# Patient Record
Sex: MALE | HISPANIC OR LATINO | Employment: FULL TIME | URBAN - METROPOLITAN AREA
[De-identification: names, ages, dates, MRNs, and addresses within clinical notes are randomized per-mention and may not be internally consistent; named-entity substitution may affect disease eponyms.]

---
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
## 2022-09-30 ENCOUNTER — HOSPITAL ENCOUNTER (EMERGENCY)
Facility: CLINIC | Age: 22
Discharge: LEFT BEFORE DISCHARGE | End: 2022-10-01
Payer: COMMERCIAL

## 2022-09-30 VITALS
RESPIRATION RATE: 20 BRPM | HEART RATE: 78 BPM | DIASTOLIC BLOOD PRESSURE: 60 MMHG | TEMPERATURE: 98.2 F | SYSTOLIC BLOOD PRESSURE: 140 MMHG | OXYGEN SATURATION: 100 %

## 2022-10-01 ENCOUNTER — HOSPITAL ENCOUNTER (EMERGENCY)
Facility: CLINIC | Age: 22
Discharge: HOME | End: 2022-10-01
Attending: PHYSICIAN ASSISTANT | Admitting: PHYSICIAN ASSISTANT
Payer: COMMERCIAL

## 2022-10-01 ENCOUNTER — OFFICE VISIT (OUTPATIENT)
Dept: URGENT CARE | Facility: URGENT CARE | Age: 22
End: 2022-10-01
Payer: COMMERCIAL

## 2022-10-01 VITALS
TEMPERATURE: 98.4 F | HEART RATE: 112 BPM | OXYGEN SATURATION: 95 % | DIASTOLIC BLOOD PRESSURE: 77 MMHG | BODY MASS INDEX: 24.21 KG/M2 | WEIGHT: 150 LBS | RESPIRATION RATE: 20 BRPM | SYSTOLIC BLOOD PRESSURE: 132 MMHG

## 2022-10-01 VITALS
DIASTOLIC BLOOD PRESSURE: 68 MMHG | SYSTOLIC BLOOD PRESSURE: 147 MMHG | RESPIRATION RATE: 18 BRPM | OXYGEN SATURATION: 98 % | TEMPERATURE: 98.1 F | HEART RATE: 102 BPM
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
## 2022-10-01 DIAGNOSIS — T14.8XXA PUNCTURE WOUND: ICD-10-CM
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
## 2022-10-01 PROCEDURE — 99203 OFFICE O/P NEW LOW 30 MIN: CPT | Performed by: NURSE PRACTITIONER

## 2022-10-01 PROCEDURE — 99283 EMERGENCY DEPT VISIT LOW MDM: CPT

## 2022-10-01 ASSESSMENT — ENCOUNTER SYMPTOMS
WOUND: 1
WEAKNESS: 0
COLOR CHANGE: 0
NUMBNESS: 0

## 2022-10-01 ASSESSMENT — ACTIVITIES OF DAILY LIVING (ADL): ADLS_ACUITY_SCORE: 33

## 2022-10-01 NOTE — ED PROVIDER NOTES
History     Chief Complaint:  Dog Bite       HPI   Noel Martin is a 22 year old male who presents with a dog bite to his posterior right thigh that he obtained from his dog yesterday.  He reports the dog was playing with a rope and accidentally grabbed his skin in the back of his thigh.  He presented to a local ER but the wait was too long and he left without being seen.  No fevers.  He is accompanied by his significant other.  He reports the dog is up-to-date on his rabies as far as he knows of.  His last tetanus was in 2020.  He denies any distal weakness or numbness.    ROS:  Review of Systems   Skin: Positive for wound. Negative for color change.   Neurological: Negative for weakness and numbness.   All other systems reviewed and are negative.    Allergies:  No Known Allergies     Medications:    None    Past Medical History:    No past medical history on file.  Patient Active Problem List   Diagnosis     Abdominal pain, generalized since early 9-18     Other acute gastritis without hemorrhage-peptic acid      Microscopic hematuria        Past Surgical History:    No pertinent Surgical history    Family History:    family history includes Unknown/Adopted in his father and mother.    Social History:  Presents to the ED with his significant other.    Physical Exam     Patient Vitals for the past 24 hrs:   BP Temp Pulse Resp SpO2   10/01/22 1517 (!) 147/68 98.1  F (36.7  C) 102 18 98 %        Physical Exam  Vitals and nursing note reviewed.   Eyes:      General: No scleral icterus.     Extraocular Movements: Extraocular movements intact.      Pupils: Pupils are equal, round, and reactive to light.   Musculoskeletal:      Right upper leg: Laceration and tenderness present. No swelling, edema or bony tenderness.      Right knee: Normal. No swelling, erythema, lacerations or bony tenderness. Normal range of motion. No tenderness.      Right lower leg: Normal. No swelling, lacerations, tenderness or bony  tenderness. No edema.   Skin:     General: Skin is warm.      Capillary Refill: Capillary refill takes less than 2 seconds.      Findings: Wound (puncture wounds to the posterior right thigh) present.          Neurological:      Sensory: Sensation is intact.      Motor: Motor function is intact. No weakness.   Psychiatric:         Attention and Perception: Attention and perception normal.         Mood and Affect: Mood and affect normal.       Emergency Department Course     Imaging:  No orders to display      Laboratory:  Labs Ordered and Resulted from Time of ED Arrival to Time of ED Departure - No data to display     Procedures     Emergency Department Course:         Reviewed:  I reviewed nursing notes, vitals, past medical history and MIIC    Assessments/Consults:          Interventions:  Medications - No data to display     Disposition:  The patient was discharged to home.     Impression & Plan    CMS Diagnoses: None    Medical Decision Making:     I considered multiple diagnoses including but not limited to: Laceration, fracture, ligament injury, nerve injury, vascular injury.  After evaluation based on his history of present illness and exam findings I did not feel that he exhibited in any of the above diagnoses except for laceration of the skin and subcutaneous tissues. Wounds were not close to to the nature of these wounds as puncture wounds and being from a dog bite. They were thoroughly irrigated and he will be placed on prophylactic antibiotics. He should monitor for signs of infection which include erythema, swelling, pain, purulent discharge, lymphatic streaking.  If he notes any of these signs or symptoms he should return or seek an evaluation.   He can utilize over-the-counter anti-inflammatories for pain control.   He may shower and gently rinse the area.  Tetanus was up to date.    My impression of today's diagnosis is:     ICD-10-CM    1. Dog bite, initial encounter  W54.0XXA    2. Puncture wound   T14.8XXA        Discharge Medications:  Discharge Medication List as of 10/1/2022  4:19 PM      START taking these medications    Details   amoxicillin-clavulanate (AUGMENTIN) 875-125 MG tablet Take 1 tablet by mouth 2 times daily for 7 days, Disp-14 tablet, R-0, Local Print              10/1/2022   Scott Boyer PA-C Kruger, Jacob C, PA-C  10/01/22 8489

## 2022-10-01 NOTE — PROGRESS NOTES
Assessment & Plan     Dog bite, initial encounter  Sent to ER to evaluate tendon involvement and possible irrigation of dog bite      Return today (on 10/1/2022), or to ER, for Follow up.    MODESTO Whitley CNP  [facility] MECHE Cohen is a 22 year old male who presents to clinic today for the following health issues:  Chief Complaint   Patient presents with     Urgent Care     His dog accidentally bite his right leg yesterday      HPI    Dog Bite/Sting    Onset of symptoms was 10 hour(s) ago.  Course of illness is worsening.    Severity severe  Location: posterior right knee  Context: his dog, unsure of vaccinations  Current and Associated symptoms: pain and drainage  Treatment measures tried include: nothing  Relief from treatment: none  Significant past medical history: N/A  Worried about tendon damage due to pain and immobility    Went to ER last night but wait was too long and left before being seen.    Still bleeding.      Review of Systems  Constitutional, HEENT, cardiovascular, pulmonary, GI, , musculoskeletal, neuro, skin, endocrine and psych systems are negative, except as otherwise noted.      Objective    /77   Pulse 112   Temp 98.4  F (36.9  C)   Resp 20   Wt 68 kg (150 lb)   SpO2 95%   BMI 24.21 kg/m    Physical Exam   GENERAL: healthy, alert and moderate distress  MS: no gross musculoskeletal defects noted, no edema, decreased ROM to right knee due to pain  Skin: 3 puncture wounds with mild bleeding and excoriation to posterior right knee.

## 2022-10-01 NOTE — ED TRIAGE NOTES
Patient has a dog bite to his right knee area, patient states he happened yesterday and he attempted to be seen at a ED but the wait was to long. Patient states he went to urgent care today and was recommened to come to the ED. Patient is unsure if the dog has had his rabies shot.

## 2022-10-01 NOTE — LETTER
October 3, 2022      To Whom It May Concern:      Noel Martin was seen in our Emergency Department today, 10/01/22.  I expect his condition to improve over the next 2-3 days.  He may return to work/school when improved.    Sincerely,        Tiffanie RAOMS RN

## 2025-01-26 ENCOUNTER — HOSPITAL ENCOUNTER (EMERGENCY)
Facility: CLINIC | Age: 25
Discharge: HOME | End: 2025-01-26
Attending: EMERGENCY MEDICINE | Admitting: EMERGENCY MEDICINE

## 2025-01-26 ENCOUNTER — APPOINTMENT (OUTPATIENT)
Dept: GENERAL RADIOLOGY | Facility: CLINIC | Age: 25
End: 2025-01-26
Attending: EMERGENCY MEDICINE

## 2025-01-26 VITALS
DIASTOLIC BLOOD PRESSURE: 107 MMHG | WEIGHT: 152.12 LBS | RESPIRATION RATE: 18 BRPM | BODY MASS INDEX: 24.55 KG/M2 | OXYGEN SATURATION: 99 % | TEMPERATURE: 98.5 F | HEART RATE: 110 BPM | SYSTOLIC BLOOD PRESSURE: 164 MMHG

## 2025-01-26 DIAGNOSIS — S63.259A DISLOCATION OF FINGER, INITIAL ENCOUNTER: ICD-10-CM

## 2025-01-26 PROCEDURE — 999N000065 XR FINGER LEFT G/E 2 VIEWS

## 2025-01-26 PROCEDURE — 26770 TREAT FINGER DISLOCATION: CPT | Mod: F3

## 2025-01-26 PROCEDURE — 73140 X-RAY EXAM OF FINGER(S): CPT | Mod: LT

## 2025-01-26 PROCEDURE — 250N000013 HC RX MED GY IP 250 OP 250 PS 637: Performed by: EMERGENCY MEDICINE

## 2025-01-26 PROCEDURE — 99283 EMERGENCY DEPT VISIT LOW MDM: CPT | Mod: 25

## 2025-01-26 RX ORDER — OXYCODONE HYDROCHLORIDE 5 MG/1
5 TABLET ORAL ONCE
Status: COMPLETED | OUTPATIENT
Start: 2025-01-26 | End: 2025-01-26

## 2025-01-26 RX ADMIN — OXYCODONE HYDROCHLORIDE 5 MG: 5 TABLET ORAL at 00:46

## 2025-01-26 ASSESSMENT — COLUMBIA-SUICIDE SEVERITY RATING SCALE - C-SSRS
2. HAVE YOU ACTUALLY HAD ANY THOUGHTS OF KILLING YOURSELF IN THE PAST MONTH?: NO
1. IN THE PAST MONTH, HAVE YOU WISHED YOU WERE DEAD OR WISHED YOU COULD GO TO SLEEP AND NOT WAKE UP?: NO
6. HAVE YOU EVER DONE ANYTHING, STARTED TO DO ANYTHING, OR PREPARED TO DO ANYTHING TO END YOUR LIFE?: NO

## 2025-01-26 ASSESSMENT — ACTIVITIES OF DAILY LIVING (ADL): ADLS_ACUITY_SCORE: 41

## 2025-01-26 NOTE — ED PROVIDER NOTES
Emergency Department Note      History of Present Illness     Chief Complaint   Hand Pain      HPI   Noel Martin is a 24 year old male who presents to the ED for evaluation of a hand injury. The patient states he was rough housing with some friends when he felt a sudden sharp pain in his hand. At that time, he noticed his left fourth finger was obviously deformed and he had a small open wound to the palmar aspect of his left fourth finger with minimal bleeding. Expresses concern for an open fracture. Endorses alcohol use tonight. No head injury or fall.     Independent Historian   None    Review of External Notes   10/1/22: ED note reviewed    Past Medical History     Medical History and Problem List   Pityriasis alba    Medications   The patient is not currently taking any prescribed medications.    Physical Exam     Patient Vitals for the past 24 hrs:   BP Temp Temp src Pulse Resp SpO2 Weight   01/26/25 0140 -- -- -- (!) 110 18 99 % --   01/26/25 0035 (!) 164/107 98.5  F (36.9  C) Temporal (!) 130 20 98 % 69 kg (152 lb 1.9 oz)     Physical Exam  General: No acute distress.  Head: No obvious trauma to head.  Eyes:  Conjunctivae clear.   Neck: Normal range of motion.   CV: Skin is well perfused, no cyanosis  Respiratory: Effort normal. No audible wheezing.  Gastrointestinal: Non-distended.  Musculoskeletal: Normal range of motion. No gross deformities. Left ring finger is medially angulated distal to the PIP.   Neuro: Alert. Moving all extremities appropriately.  Normal speech.  Sensation grossly intact in the left hand.  Skin: No rashes or lesions on exposed skin. 3 mm circular superficial abrasion near the left ring finger PIP joint with no active hemorrhaging.     Diagnostics     Lab Results   Labs Ordered and Resulted from Time of ED Arrival to Time of ED Departure - No data to display    Imaging   Fingers XR, 2-3 views, left   Final Result   IMPRESSION:       No evidence of acute fracture or  dislocation. Joint spaces are preserved. Soft tissues grossly unremarkable.           Independent Interpretation   X-ray finger shows no fracture or dislocation    ED Course      Medications Administered   Medications   oxyCODONE (ROXICODONE) tablet 5 mg (5 mg Oral $Given 1/26/25 0046)       Procedures   Procedures     Dislocation Reduction   Procedure: Dislocation Reduction  Consent: Verbal from Patient  Risks Discussed: Pain, need for repeat attempts, fracture, neurovascular injury, unsuccessful attempts, and need to go to OR  Universal Protocol: Universal protocol was followed and time out conducted just prior to starting procedure, confirming patient identity, site/side, procedure, patient position, and availability of correct equipment and implants.    Indication: Dislocated L fourth (ring) finger   Location: Left L fourth (ring) finger PIP joint  Anesthesia/Sedation: None  Procedure Detail: I manipulated the joint including Traction-counter traction    Immobilized with Prefab splint   Post procedure assessment:  Gross deformity resolved , Neurovascular intact , and ROM improved   Patient Status: The patient tolerated the procedure well: Yes. There were no complications.    Discussion of Management   None    ED Course   ED Course as of 01/26/25 2152   Sun Jan 26, 2025   0040 I obtained history and performed a physical exam as noted above. I performed the finger dislocation reduction as noted above.       Additional Documentation  None    Medical Decision Making / Diagnosis     CMS Diagnoses: None    MIPS       None    MDM   Noel Martin is a 24 year old male presents with a finger injury. He has a gross deformity at the PIP joint consistent with a dislocation. The dislocation is successfully reduced. Pain and range of motion improves. Xray is obtained and confirms successful reduction and no evidence of fracture. There is a small abrasion near the PIP joint but this is superficial and does not track  deep or involve the joint. A splint is placed.  Patient's pain is significantly improved.  He is instructed to follow-up with orthopedic surgery.  Return precautions are given and he verbalizes understanding.  Patient is discharged home in stable condition.      Disposition   The patient was discharged.     Diagnosis     ICD-10-CM    1. Dislocation of finger, initial encounter  S63.259A            Discharge Medications   There are no discharge medications for this patient.        Scribe Disclosure:  I, Brinda Gutierrez, am serving as a scribe at 12:45 AM on 1/26/2025 to document services personally performed by Twin Rodriguez MD based on my observations and the provider's statements to me.        Twin Rodriguez MD  01/26/25 2130

## 2025-01-26 NOTE — ED TRIAGE NOTES
Pt was wrestling with his friends and dislocated his ring finger on his left hand. Pt admits to be intoxicated. Pt has an open area noted

## 2025-01-26 NOTE — DISCHARGE INSTRUCTIONS
You have no fracture in your finger.  The finger is back in place.  Wear the splint until you follow-up with the bone doctors.  Please call them at the number provided above to set up a follow-up appointment this week.  You can take over-the-counter pain medications and ice for pain.  Please return the emergency department if you develop any new or concerning symptoms.